# Patient Record
Sex: MALE | Race: WHITE | NOT HISPANIC OR LATINO | URBAN - METROPOLITAN AREA
[De-identification: names, ages, dates, MRNs, and addresses within clinical notes are randomized per-mention and may not be internally consistent; named-entity substitution may affect disease eponyms.]

---

## 2018-05-31 ENCOUNTER — EMERGENCY (EMERGENCY)
Facility: HOSPITAL | Age: 43
LOS: 1 days | Discharge: HOME | End: 2018-05-31
Attending: EMERGENCY MEDICINE | Admitting: EMERGENCY MEDICINE
Payer: COMMERCIAL

## 2018-05-31 VITALS
OXYGEN SATURATION: 98 % | RESPIRATION RATE: 16 BRPM | TEMPERATURE: 98 F | SYSTOLIC BLOOD PRESSURE: 131 MMHG | DIASTOLIC BLOOD PRESSURE: 91 MMHG | HEART RATE: 70 BPM

## 2018-05-31 VITALS
SYSTOLIC BLOOD PRESSURE: 132 MMHG | DIASTOLIC BLOOD PRESSURE: 88 MMHG | WEIGHT: 289.91 LBS | OXYGEN SATURATION: 99 % | HEART RATE: 77 BPM | RESPIRATION RATE: 18 BRPM | TEMPERATURE: 98 F

## 2018-05-31 DIAGNOSIS — I10 ESSENTIAL (PRIMARY) HYPERTENSION: ICD-10-CM

## 2018-05-31 DIAGNOSIS — R07.89 OTHER CHEST PAIN: ICD-10-CM

## 2018-05-31 DIAGNOSIS — E78.5 HYPERLIPIDEMIA, UNSPECIFIED: ICD-10-CM

## 2018-05-31 DIAGNOSIS — R07.9 CHEST PAIN, UNSPECIFIED: ICD-10-CM

## 2018-05-31 DIAGNOSIS — E78.00 PURE HYPERCHOLESTEROLEMIA, UNSPECIFIED: ICD-10-CM

## 2018-05-31 DIAGNOSIS — Z79.899 OTHER LONG TERM (CURRENT) DRUG THERAPY: ICD-10-CM

## 2018-05-31 LAB
ALBUMIN SERPL ELPH-MCNC: 4.2 G/DL — SIGNIFICANT CHANGE UP (ref 3.3–5)
ALP SERPL-CCNC: 76 U/L — SIGNIFICANT CHANGE UP (ref 40–120)
ALT FLD-CCNC: 52 U/L — HIGH (ref 10–45)
ANION GAP SERPL CALC-SCNC: 15 MMOL/L — SIGNIFICANT CHANGE UP (ref 5–17)
APTT BLD: 28.7 SEC — SIGNIFICANT CHANGE UP (ref 27.5–37.4)
AST SERPL-CCNC: 33 U/L — SIGNIFICANT CHANGE UP (ref 10–40)
BASOPHILS NFR BLD AUTO: 0.3 % — SIGNIFICANT CHANGE UP (ref 0–2)
BILIRUB SERPL-MCNC: 0.4 MG/DL — SIGNIFICANT CHANGE UP (ref 0.2–1.2)
BUN SERPL-MCNC: 22 MG/DL — SIGNIFICANT CHANGE UP (ref 7–23)
CALCIUM SERPL-MCNC: 9.2 MG/DL — SIGNIFICANT CHANGE UP (ref 8.4–10.5)
CHLORIDE SERPL-SCNC: 100 MMOL/L — SIGNIFICANT CHANGE UP (ref 96–108)
CK MB CFR SERPL CALC: 6.8 NG/ML — HIGH (ref 0–6.7)
CO2 SERPL-SCNC: 24 MMOL/L — SIGNIFICANT CHANGE UP (ref 22–31)
CREAT SERPL-MCNC: 1.22 MG/DL — SIGNIFICANT CHANGE UP (ref 0.5–1.3)
EOSINOPHIL NFR BLD AUTO: 1.7 % — SIGNIFICANT CHANGE UP (ref 0–6)
GLUCOSE SERPL-MCNC: 116 MG/DL — HIGH (ref 70–99)
HCT VFR BLD CALC: 39.8 % — SIGNIFICANT CHANGE UP (ref 39–50)
HGB BLD-MCNC: 13.8 G/DL — SIGNIFICANT CHANGE UP (ref 13–17)
INR BLD: 0.99 — SIGNIFICANT CHANGE UP (ref 0.88–1.16)
LYMPHOCYTES # BLD AUTO: 46.3 % — HIGH (ref 13–44)
MCHC RBC-ENTMCNC: 29.9 PG — SIGNIFICANT CHANGE UP (ref 27–34)
MCHC RBC-ENTMCNC: 34.7 G/DL — SIGNIFICANT CHANGE UP (ref 32–36)
MCV RBC AUTO: 86.3 FL — SIGNIFICANT CHANGE UP (ref 80–100)
MONOCYTES NFR BLD AUTO: 5.6 % — SIGNIFICANT CHANGE UP (ref 2–14)
NEUTROPHILS NFR BLD AUTO: 46.1 % — SIGNIFICANT CHANGE UP (ref 43–77)
PLATELET # BLD AUTO: 156 K/UL — SIGNIFICANT CHANGE UP (ref 150–400)
POTASSIUM SERPL-MCNC: 3.9 MMOL/L — SIGNIFICANT CHANGE UP (ref 3.5–5.3)
POTASSIUM SERPL-SCNC: 3.9 MMOL/L — SIGNIFICANT CHANGE UP (ref 3.5–5.3)
PROT SERPL-MCNC: 6.6 G/DL — SIGNIFICANT CHANGE UP (ref 6–8.3)
PROTHROM AB SERPL-ACNC: 11 SEC — SIGNIFICANT CHANGE UP (ref 9.8–12.7)
RBC # BLD: 4.61 M/UL — SIGNIFICANT CHANGE UP (ref 4.2–5.8)
RBC # FLD: 12.8 % — SIGNIFICANT CHANGE UP (ref 10.3–16.9)
SODIUM SERPL-SCNC: 139 MMOL/L — SIGNIFICANT CHANGE UP (ref 135–145)
TROPONIN T SERPL-MCNC: <0.01 NG/ML — SIGNIFICANT CHANGE UP (ref 0–0.01)
WBC # BLD: 6.4 K/UL — SIGNIFICANT CHANGE UP (ref 3.8–10.5)
WBC # FLD AUTO: 6.4 K/UL — SIGNIFICANT CHANGE UP (ref 3.8–10.5)

## 2018-05-31 PROCEDURE — 82553 CREATINE MB FRACTION: CPT

## 2018-05-31 PROCEDURE — 99284 EMERGENCY DEPT VISIT MOD MDM: CPT | Mod: 25

## 2018-05-31 PROCEDURE — 82550 ASSAY OF CK (CPK): CPT

## 2018-05-31 PROCEDURE — 93010 ELECTROCARDIOGRAM REPORT: CPT

## 2018-05-31 PROCEDURE — 36415 COLL VENOUS BLD VENIPUNCTURE: CPT

## 2018-05-31 PROCEDURE — 85730 THROMBOPLASTIN TIME PARTIAL: CPT

## 2018-05-31 PROCEDURE — 84484 ASSAY OF TROPONIN QUANT: CPT

## 2018-05-31 PROCEDURE — 80053 COMPREHEN METABOLIC PANEL: CPT

## 2018-05-31 PROCEDURE — 85610 PROTHROMBIN TIME: CPT

## 2018-05-31 PROCEDURE — 71045 X-RAY EXAM CHEST 1 VIEW: CPT | Mod: 26

## 2018-05-31 PROCEDURE — 93005 ELECTROCARDIOGRAM TRACING: CPT

## 2018-05-31 PROCEDURE — 75574 CT ANGIO HRT W/3D IMAGE: CPT | Mod: 26

## 2018-05-31 PROCEDURE — 71045 X-RAY EXAM CHEST 1 VIEW: CPT

## 2018-05-31 PROCEDURE — 75574 CT ANGIO HRT W/3D IMAGE: CPT

## 2018-05-31 PROCEDURE — 99284 EMERGENCY DEPT VISIT MOD MDM: CPT

## 2018-05-31 PROCEDURE — 85025 COMPLETE CBC W/AUTO DIFF WBC: CPT

## 2018-05-31 RX ORDER — LOSARTAN POTASSIUM 100 MG/1
1 TABLET, FILM COATED ORAL
Qty: 0 | Refills: 0 | COMMUNITY

## 2018-05-31 RX ORDER — ASPIRIN/CALCIUM CARB/MAGNESIUM 324 MG
325 TABLET ORAL ONCE
Qty: 0 | Refills: 0 | Status: DISCONTINUED | OUTPATIENT
Start: 2018-05-31 | End: 2018-05-31

## 2018-05-31 RX ORDER — SODIUM CHLORIDE 9 MG/ML
1000 INJECTION INTRAMUSCULAR; INTRAVENOUS; SUBCUTANEOUS ONCE
Qty: 0 | Refills: 0 | Status: COMPLETED | OUTPATIENT
Start: 2018-05-31 | End: 2018-05-31

## 2018-05-31 RX ORDER — ROSUVASTATIN CALCIUM 5 MG/1
1 TABLET ORAL
Qty: 0 | Refills: 0 | COMMUNITY

## 2018-05-31 RX ORDER — METOPROLOL TARTRATE 50 MG
100 TABLET ORAL ONCE
Qty: 0 | Refills: 0 | Status: COMPLETED | OUTPATIENT
Start: 2018-05-31 | End: 2018-05-31

## 2018-05-31 RX ADMIN — Medication 100 MILLIGRAM(S): at 08:18

## 2018-05-31 RX ADMIN — SODIUM CHLORIDE 1000 MILLILITER(S): 9 INJECTION INTRAMUSCULAR; INTRAVENOUS; SUBCUTANEOUS at 08:19

## 2018-05-31 NOTE — ED ADULT NURSE REASSESSMENT NOTE - NS ED NURSE REASSESS COMMENT FT1
Pt received from night ROSALINDA Caldwell. Pt resting comfortably in NAD, awaiting CTA. Will continue to monitor.

## 2018-05-31 NOTE — ED PROVIDER NOTE - MEDICAL DECISION MAKING DETAILS
41 y/o m chest pain this morning; EKG nonischemic, labs pending, pt reporting mild pressure in ED, will plan to contact cardiologist after labs result.

## 2018-05-31 NOTE — ED PROVIDER NOTE - PROGRESS NOTE DETAILS
1st trop negative, Dr. Cormier in ED, will get CTA coronary study coronary artery portion of CTA neg, pulmonary portion subsequently neg

## 2018-05-31 NOTE — ED PROVIDER NOTE - OBJECTIVE STATEMENT
41 y/o m hx HTN, HLD presents c/o chest pressure which came on 2 hours prior to arrival in ED.  Pt stating was diaphoretic at the time, still having mild pressure although reports most of symptoms have subsided.  Pt denies SOB, fever, chills, cough, n/v, all other ROS negative.

## 2018-05-31 NOTE — CONSULT NOTE ADULT - ASSESSMENT
41 yo male c/o 9/10 left sided non-radiating chest pain that "woke him up this morning at 4am" with diaphoresis.

## 2018-05-31 NOTE — ED PROVIDER NOTE - ATTENDING CONTRIBUTION TO CARE
43 yo M hx of HTN p/w L sided chest pressure with diaphoresis noted upon awakening this AM 2 hours pta, gradually improving and nonradiating.  Pt looks well.  EKG nonischemic, NSR.  VSS.  Plan labs, CTa coronaries and reassess.

## 2018-05-31 NOTE — CONSULT NOTE ADULT - PROBLEM SELECTOR RECOMMENDATION 9
sudden onset of acute chest pain not exertionally related.  Will obtain CCTA to evaluate CAD risk and coronary stenosis

## 2018-05-31 NOTE — CONSULT NOTE ADULT - SUBJECTIVE AND OBJECTIVE BOX
HPI:  41 yo male presented to the ED c/o 9/10 left sided non-radiating chest pain that "woke him up this morning at 4am".  Accompanying symptoms: diaphoresis.  No N/V/D/, SOB, no TEAGUE, no syncope, no palpitations, no fever, no chills, no cough, no BL LE edema and/or any other discomfort.  Pain is not reproducible to touch and does not worsen with inspiration and/or movement.  Pain is much improved at this time rated 3/10 during this consult.     CODE STATUS:   Full Code  	  HOME MEDICATIONS:  Crestor 10 mg oral tablet: 1 tab(s) orally once a day (at bedtime) (31 May 2018 06:39)  losartan 25 mg oral tablet: 1 tab(s) orally once a day (31 May 2018 06:39)    ALLERGIES:  NKDA    PAST MEDICAL HISTORY:   High cholesterol  Hypertension    PAST SURGICAL HISTORY:   Denies    FAMILY HISTORY:   Heart disease - Father  Hypertension - Mother    SOCIAL HISTORY:   Denies alcohol intake  Denies illicit drug use  Denies cigarette smoking    REVIEW OF SYSTEMS:   CONSTITUTIONAL, EYES, ENMT, NECK, RESPIRATORY, GASTROINTESTINAL, GENITOURINARY, NEUROLOGICAL, SKIN, ENDOCRINE, MUSCULOSKELETAL, PSYCHIATRIC, HEME/LYMPH are all negative.  CARDIOVASCULAR: as per HPI     PHYSICAL EXAM:   Vital Signs Last 24 Hrs  T(C): 36.7 (31 May 2018 06:36), Max: 36.7 (31 May 2018 06:36)  T(F): 98.1 (31 May 2018 06:36), Max: 98.1 (31 May 2018 06:36)  HR: 87 (31 May 2018 07:04) (77 - 87)  BP: 141/90 (31 May 2018 07:04) (132/88 - 141/90)  BP(mean): --  RR: 16 (31 May 2018 07:04) (16 - 18)  SpO2: 98% (31 May 2018 07:04) (98% - 99%)    Appearance: WDWN, in no visible acute distress, speaking in full sentences	  HEENT: NC/AT, PERRL, EOMI, no epistaxis, no oropharyngeal exudate  	  Lymphatic: no cervical lymphadenopathy  Cardiovascular: S1, S1, no S3, no S4, no murmurs, no JVD, no BL LE edema  Respiratory: even and unlabored respirations, CTA bilateral	  Psychiatry: mood and affect appropriate  Gastrointestinal:  soft NT ND with +BS x 4 quadrants	  Skin: warm, dry and intact. No evidence of rash  Neurologic: AA&Ox 3, no neuro focal deficits  Extremities:  2+ radial pulses equal bilateral / 1+ DP pulses equal bilateral    TELEMETRY:	  12 Lead ECG in ED: NSR @ 68 bpm    LABS:  05-31    139  |  100  |  22  ----------------------------<  116<H>  3.9   |  24  |  1.22    Ca    9.2      31 May 2018 07:06    TPro  6.6  /  Alb  4.2  /  TBili  0.4  /  DBili  x   /  AST  33  /  ALT  52<H>  /  AlkPhos  76  05-31                          13.8   6.4   )-----------( 156      ( 31 May 2018 07:06 )             39.8     PT/INR - ( 31 May 2018 07:06 )   PT: 11.0 sec;   INR: 0.99          PTT - ( 31 May 2018 07:06 )  PTT:28.7 sec    CARDIAC MARKERS ( 31 May 2018 07:06 )  x     / <0.01 ng/mL / 454 U/L / x     / 6.8 ng/mL    CCTA pending